# Patient Record
Sex: MALE | Race: WHITE
[De-identification: names, ages, dates, MRNs, and addresses within clinical notes are randomized per-mention and may not be internally consistent; named-entity substitution may affect disease eponyms.]

---

## 2021-05-18 ENCOUNTER — HOSPITAL ENCOUNTER (OUTPATIENT)
Age: 71
End: 2021-05-18
Payer: COMMERCIAL

## 2021-05-18 DIAGNOSIS — M47.817: ICD-10-CM

## 2021-05-18 DIAGNOSIS — M51.36: ICD-10-CM

## 2021-05-18 DIAGNOSIS — M47.816: ICD-10-CM

## 2021-05-18 DIAGNOSIS — M54.9: Primary | ICD-10-CM

## 2021-05-18 DIAGNOSIS — M41.9: ICD-10-CM

## 2021-05-18 PROCEDURE — 72110 X-RAY EXAM L-2 SPINE 4/>VWS: CPT

## 2021-06-09 ENCOUNTER — HOSPITAL ENCOUNTER (OUTPATIENT)
Age: 71
End: 2021-06-09
Payer: COMMERCIAL

## 2021-06-09 DIAGNOSIS — Z20.822: ICD-10-CM

## 2021-06-09 DIAGNOSIS — Z01.812: Primary | ICD-10-CM

## 2021-06-09 PROCEDURE — 87635 SARS-COV-2 COVID-19 AMP PRB: CPT

## 2021-06-10 ENCOUNTER — HOSPITAL ENCOUNTER (OUTPATIENT)
Age: 71
Discharge: HOME | End: 2021-06-10
Payer: COMMERCIAL

## 2021-06-10 VITALS
RESPIRATION RATE: 16 BRPM | DIASTOLIC BLOOD PRESSURE: 76 MMHG | SYSTOLIC BLOOD PRESSURE: 115 MMHG | TEMPERATURE: 97.8 F | HEART RATE: 82 BPM | OXYGEN SATURATION: 97 %

## 2021-06-10 VITALS
SYSTOLIC BLOOD PRESSURE: 111 MMHG | DIASTOLIC BLOOD PRESSURE: 73 MMHG | RESPIRATION RATE: 14 BRPM | OXYGEN SATURATION: 96 % | TEMPERATURE: 97.16 F | HEART RATE: 86 BPM

## 2021-06-10 VITALS
HEART RATE: 82 BPM | OXYGEN SATURATION: 99 % | RESPIRATION RATE: 16 BRPM | SYSTOLIC BLOOD PRESSURE: 116 MMHG | DIASTOLIC BLOOD PRESSURE: 75 MMHG | TEMPERATURE: 98.24 F

## 2021-06-10 VITALS — BODY MASS INDEX: 20.7 KG/M2

## 2021-06-10 DIAGNOSIS — I10: ICD-10-CM

## 2021-06-10 DIAGNOSIS — J44.9: ICD-10-CM

## 2021-06-10 DIAGNOSIS — E11.9: ICD-10-CM

## 2021-06-10 DIAGNOSIS — K21.9: ICD-10-CM

## 2021-06-10 DIAGNOSIS — Z79.84: ICD-10-CM

## 2021-06-10 DIAGNOSIS — E78.5: ICD-10-CM

## 2021-06-10 DIAGNOSIS — M72.0: Primary | ICD-10-CM

## 2021-06-10 PROCEDURE — 26123 RELEASE PALM CONTRACTURE: CPT

## 2022-10-11 ENCOUNTER — HOSPITAL ENCOUNTER (OUTPATIENT)
Dept: HOSPITAL 73 - RAD | Age: 72
Discharge: HOME | End: 2022-10-11
Payer: COMMERCIAL

## 2022-10-11 VITALS
DIASTOLIC BLOOD PRESSURE: 55 MMHG | RESPIRATION RATE: 15 BRPM | HEART RATE: 73 BPM | OXYGEN SATURATION: 97 % | SYSTOLIC BLOOD PRESSURE: 110 MMHG

## 2022-10-11 VITALS
RESPIRATION RATE: 19 BRPM | SYSTOLIC BLOOD PRESSURE: 116 MMHG | HEART RATE: 69 BPM | OXYGEN SATURATION: 97 % | DIASTOLIC BLOOD PRESSURE: 56 MMHG

## 2022-10-11 VITALS
HEART RATE: 76 BPM | TEMPERATURE: 97.4 F | DIASTOLIC BLOOD PRESSURE: 72 MMHG | RESPIRATION RATE: 16 BRPM | OXYGEN SATURATION: 99 % | SYSTOLIC BLOOD PRESSURE: 113 MMHG

## 2022-10-11 VITALS
OXYGEN SATURATION: 99 % | RESPIRATION RATE: 16 BRPM | SYSTOLIC BLOOD PRESSURE: 115 MMHG | HEART RATE: 76 BPM | DIASTOLIC BLOOD PRESSURE: 66 MMHG

## 2022-10-11 VITALS
HEART RATE: 70 BPM | SYSTOLIC BLOOD PRESSURE: 108 MMHG | RESPIRATION RATE: 16 BRPM | OXYGEN SATURATION: 100 % | DIASTOLIC BLOOD PRESSURE: 56 MMHG

## 2022-10-11 VITALS
DIASTOLIC BLOOD PRESSURE: 56 MMHG | RESPIRATION RATE: 20 BRPM | OXYGEN SATURATION: 96 % | SYSTOLIC BLOOD PRESSURE: 99 MMHG | HEART RATE: 69 BPM

## 2022-10-11 DIAGNOSIS — M47.814: Primary | ICD-10-CM

## 2022-10-11 PROCEDURE — 64490 INJ PARAVERT F JNT C/T 1 LEV: CPT

## 2022-10-11 PROCEDURE — 64492 INJ PARAVERT F JNT C/T 3 LEV: CPT

## 2022-10-11 PROCEDURE — 64491 INJ PARAVERT F JNT C/T 2 LEV: CPT

## 2022-10-11 RX ADMIN — BUPIVACAINE HYDROCHLORIDE 2 ML: 5 INJECTION, SOLUTION EPIDURAL; INTRACAUDAL; PERINEURAL at 10:14

## 2022-10-11 RX ADMIN — IOPAMIDOL 3 ML: 612 INJECTION, SOLUTION INTRATHECAL at 10:14

## 2022-10-11 RX ADMIN — DEXAMETHASONE SODIUM PHOSPHATE 30 MG: 10 INJECTION INTRAMUSCULAR; INTRAVENOUS at 10:14

## 2023-07-06 ENCOUNTER — HOSPITAL ENCOUNTER (OUTPATIENT)
Dept: HOSPITAL 73 - RAD | Age: 73
Discharge: HOME | End: 2023-07-06
Payer: COMMERCIAL

## 2023-07-06 VITALS
RESPIRATION RATE: 16 BRPM | HEART RATE: 80 BPM | DIASTOLIC BLOOD PRESSURE: 77 MMHG | OXYGEN SATURATION: 100 % | SYSTOLIC BLOOD PRESSURE: 125 MMHG

## 2023-07-06 VITALS
DIASTOLIC BLOOD PRESSURE: 58 MMHG | RESPIRATION RATE: 19 BRPM | HEART RATE: 69 BPM | SYSTOLIC BLOOD PRESSURE: 106 MMHG | OXYGEN SATURATION: 97 %

## 2023-07-06 VITALS
DIASTOLIC BLOOD PRESSURE: 55 MMHG | RESPIRATION RATE: 16 BRPM | SYSTOLIC BLOOD PRESSURE: 104 MMHG | OXYGEN SATURATION: 98 % | HEART RATE: 69 BPM

## 2023-07-06 VITALS
SYSTOLIC BLOOD PRESSURE: 119 MMHG | DIASTOLIC BLOOD PRESSURE: 57 MMHG | OXYGEN SATURATION: 98 % | RESPIRATION RATE: 78 BRPM

## 2023-07-06 VITALS
RESPIRATION RATE: 17 BRPM | OXYGEN SATURATION: 99 % | SYSTOLIC BLOOD PRESSURE: 112 MMHG | HEART RATE: 69 BPM | DIASTOLIC BLOOD PRESSURE: 57 MMHG

## 2023-07-06 VITALS
RESPIRATION RATE: 16 BRPM | DIASTOLIC BLOOD PRESSURE: 74 MMHG | HEART RATE: 83 BPM | SYSTOLIC BLOOD PRESSURE: 113 MMHG | OXYGEN SATURATION: 97 % | TEMPERATURE: 97.88 F

## 2023-07-06 VITALS
SYSTOLIC BLOOD PRESSURE: 107 MMHG | DIASTOLIC BLOOD PRESSURE: 57 MMHG | HEART RATE: 69 BPM | OXYGEN SATURATION: 98 % | RESPIRATION RATE: 20 BRPM

## 2023-07-06 DIAGNOSIS — M47.814: Primary | ICD-10-CM

## 2023-07-06 PROCEDURE — 64490 INJ PARAVERT F JNT C/T 1 LEV: CPT

## 2023-07-06 PROCEDURE — 64491 INJ PARAVERT F JNT C/T 2 LEV: CPT

## 2023-07-06 PROCEDURE — 64492 INJ PARAVERT F JNT C/T 3 LEV: CPT

## 2023-07-06 RX ADMIN — BUPIVACAINE HYDROCHLORIDE 5 ML: 5 INJECTION, SOLUTION EPIDURAL; INTRACAUDAL; PERINEURAL at 09:52

## 2023-07-06 RX ADMIN — DEXAMETHASONE SODIUM PHOSPHATE 30 MG: 10 INJECTION INTRAMUSCULAR; INTRAVENOUS at 09:53

## 2023-07-06 RX ADMIN — IOPAMIDOL 3 ML: 612 INJECTION, SOLUTION INTRATHECAL at 09:52

## 2024-08-01 ENCOUNTER — HOSPITAL ENCOUNTER (OUTPATIENT)
Age: 74
Discharge: HOME | End: 2024-08-01
Payer: COMMERCIAL

## 2024-08-01 VITALS
SYSTOLIC BLOOD PRESSURE: 126 MMHG | RESPIRATION RATE: 15 BRPM | HEART RATE: 85 BPM | OXYGEN SATURATION: 97 % | DIASTOLIC BLOOD PRESSURE: 76 MMHG | TEMPERATURE: 96.44 F

## 2024-08-01 VITALS
RESPIRATION RATE: 18 BRPM | SYSTOLIC BLOOD PRESSURE: 132 MMHG | DIASTOLIC BLOOD PRESSURE: 63 MMHG | OXYGEN SATURATION: 98 % | HEART RATE: 81 BPM

## 2024-08-01 VITALS
HEART RATE: 69 BPM | RESPIRATION RATE: 15 BRPM | SYSTOLIC BLOOD PRESSURE: 127 MMHG | DIASTOLIC BLOOD PRESSURE: 67 MMHG | OXYGEN SATURATION: 99 %

## 2024-08-01 VITALS
OXYGEN SATURATION: 97 % | SYSTOLIC BLOOD PRESSURE: 134 MMHG | DIASTOLIC BLOOD PRESSURE: 83 MMHG | HEART RATE: 73 BPM | RESPIRATION RATE: 14 BRPM

## 2024-08-01 VITALS
HEART RATE: 69 BPM | OXYGEN SATURATION: 99 % | SYSTOLIC BLOOD PRESSURE: 126 MMHG | RESPIRATION RATE: 15 BRPM | DIASTOLIC BLOOD PRESSURE: 70 MMHG

## 2024-08-01 VITALS
DIASTOLIC BLOOD PRESSURE: 62 MMHG | SYSTOLIC BLOOD PRESSURE: 128 MMHG | RESPIRATION RATE: 21 BRPM | OXYGEN SATURATION: 99 % | HEART RATE: 70 BPM

## 2024-08-01 DIAGNOSIS — M54.6: ICD-10-CM

## 2024-08-01 DIAGNOSIS — M47.814: Primary | ICD-10-CM

## 2024-08-01 PROCEDURE — 64494 INJ PARAVERT F JNT L/S 2 LEV: CPT

## 2024-08-01 PROCEDURE — 64491 INJ PARAVERT F JNT C/T 2 LEV: CPT

## 2024-08-01 PROCEDURE — 64490 INJ PARAVERT F JNT C/T 1 LEV: CPT

## 2024-08-01 PROCEDURE — 64492 INJ PARAVERT F JNT C/T 3 LEV: CPT

## 2024-08-01 NOTE — P.PCN_ITS
Date/Time/Diagnoses    
Date of procedure: 08/01/24    
Time of procedure: 09:18    
Pre-procedure diagnosis: 1.  FACET ARTHROPATHY      
2.  AXIAL THORACIC PAIN     
    
Post-procedure diagnosis: same    
Procedure Notes    
Procedure:     
1.  FLUOROSCOPICALLY GUIDED, CONTRAST-CONTROLLED LEFT T8/9, T9/10, T10/11 FACET   
JOINT INJECTIONS.    
Indications:     
Julieth is referred by Dr. Giordano for treatment of Axial Neck Pain    
Physician: Dayton Valreo    
Total Fluoroscopy time (seconds): 8    
Total sedation minutes: 0    
Complications: none    
Procedure in detail & Post-procedure care:     
DESCRIPTION OF PROCEDURE    
Fluoroscopically guided, contrast-controlled left T8/9, T9/10, T11/12 facet   
joint injections with conscious sedation.     
    
Following review of allergy and review of potential side effects and   
complications, including, but not necessarily limited to, infection, allergic   
reaction, local tissue breakdown, stroke, temporary or permanent nerve injury   
and paralysis, the patient indicated that the patient understood and agreed to   
proceed.  An informed consent document was signed by the patient, witnessed by a  
nurse, and placed in the patient's chart.  Additionally, other treatment options  
including medications, modalities, and physical therapy were reviewed with the   
patient.      
    
After review of previous anaesthesic history and IV conscious sedation the   
patient was deemed safe to proceed with today?s procedure with IV conscious   
sedation as ASA class II designation. Safety time-out was performed to confirm   
patient ID, procedure to be performed and site of procedure. IV sedation was not  
administered administered by the RN after DO order, titrated to patient comfort   
during the course of the procedure while the patient remained responsive to all   
verbal commands    
    
In the prone position, following sterile prep and drape of the cervical spine   
region, the posterior aspect of the left T8/9, T9/10, T11/12 facet joints were   
identified fluoroscopically.  The skin was anesthetized via a 25-gauge 1.5-inch   
needle with 1% lidocaine solution into the corresponding facet joints.  At this   
point, a 25-gauge 2.5-inch spinal needle was atraumatically introduced and   
advanced under fluoroscopic guidance into the corresponding facet joints.    
Following negative aspiration, injections of approximately 0.2cc of Isovue 200   
confirmed interarticular placement without vascular uptake.      
    
At this point, a total of 1cc including 0.5cc or 5mg of dexamethasone combined   
with 0.5cc of 1% lidocaine solution was injected without complication into each   
of the corresponding facet joints.      
    
The procedure tolerated the procedure well without signs or symptoms of   
complications prior to transfer to the recovery area continued monitoring   
without incident.      
    
The patient was then transferred to the recovery area where they were observed   
for an appropriate period of time after the injection.      
    
The patient reported a VAS score of 7 prior to the procedure and a post-  
procedure VAS of 0.      
    
POST OP INSTRUCTIONS    
They were provided a Pain Log to continue to record their response to the   
target-specific procedure prior to their follow-up visit with their referring   
physician.  Additionally, specific post-injection care instructions and a   
contact number to our office were provided if concerns arise regarding possible   
complications associated with the procedure are suspected.

## 2024-08-01 NOTE — DI.RAD.S_ITS
PROCEDURE:  PAIN C/T FACET INJ/BLK 1ST L 
  
INDICATIONS:  Left T8/9, T9/10, T10/11 facet injuections 
  
COMPARISON:  [facility], , PAIN C/T FACET INJ/BLK 1ST L, 7/06/2023, 9:47. 
  
FINDINGS:   
Fluoroscopic spot filming was performed to verify placement of spinal needles at the left  
T8-9, T9-10, T10-11 level(s), as labeled on the films.  Appropriate location(s) of the  
needle tip(s) was confirmed by injection of iodinated contrast.   
  
IMPRESSION:  Fluoroscopic guidance utilized for left-sided thoracic facet injections. 
  
  
Dictated by: Nilton Davey M.D. on 8/01/2024 at 14:23      
Approved by: Nilton Davey M.D. on 8/01/2024 at 14:26

## 2024-08-01 NOTE — PM.PROC.IR.1
Date/Time/Diagnoses
Date of procedure: 08/01/24
Time of procedure: 09:18
Pre-procedure diagnosis: 1.  FACET ARTHROPATHY  
2.  AXIAL THORACIC PAIN 

Post-procedure diagnosis: same
Procedure Notes
Procedure: 
1.  FLUOROSCOPICALLY GUIDED, CONTRAST-CONTROLLED LEFT T8/9, T9/10, T10/11 FACET JOINT INJECTIONS.
Indications: 
Julieth is referred by Dr. Giordano for treatment of Axial Neck Pain
Physician: Dayton Valero
Total Fluoroscopy time (seconds): 8
Total sedation minutes: 0
Complications: none
Procedure in detail & Post-procedure care: 
DESCRIPTION OF PROCEDURE
Fluoroscopically guided, contrast-controlled left T8/9, T9/10, T11/12 facet joint injections with conscious sedation. 

Following review of allergy and review of potential side effects and complications, including, but not necessarily limited to, infection, allergic reaction, local tissue breakdown, stroke, temporary or permanent nerve injury and paralysis, the 
patient indicated that the patient understood and agreed to proceed.  An informed consent document was signed by the patient, witnessed by a nurse, and placed in the patient's chart.  Additionally, other treatment options including medications, 
modalities, and physical therapy were reviewed with the patient.  

After review of previous anaesthesic history and IV conscious sedation the patient was deemed safe to proceed with today?s procedure with IV conscious sedation as ASA class II designation. Safety time-out was performed to confirm patient ID, 
procedure to be performed and site of procedure. IV sedation was not administered administered by the RN after DO order, titrated to patient comfort during the course of the procedure while the patient remained responsive to all verbal commands

In the prone position, following sterile prep and drape of the cervical spine region, the posterior aspect of the left T8/9, T9/10, T11/12 facet joints were identified fluoroscopically.  The skin was anesthetized via a 25-gauge 1.5-inch needle with 
1% lidocaine solution into the corresponding facet joints.  At this point, a 25-gauge 2.5-inch spinal needle was atraumatically introduced and advanced under fluoroscopic guidance into the corresponding facet joints.  Following negative aspiration, 
injections of approximately 0.2cc of Isovue 200 confirmed interarticular placement without vascular uptake.  

At this point, a total of 1cc including 0.5cc or 5mg of dexamethasone combined with 0.5cc of 1% lidocaine solution was injected without complication into each of the corresponding facet joints.  

The procedure tolerated the procedure well without signs or symptoms of complications prior to transfer to the recovery area continued monitoring without incident.  

The patient was then transferred to the recovery area where they were observed for an appropriate period of time after the injection.  

The patient reported a VAS score of 7 prior to the procedure and a post-procedure VAS of 0.  

POST OP INSTRUCTIONS
They were provided a Pain Log to continue to record their response to the target-specific procedure prior to their follow-up visit with their referring physician.  Additionally, specific post-injection care instructions and a contact number to our 
office were provided if concerns arise regarding possible complications associated with the procedure are suspected.